# Patient Record
Sex: MALE | Race: WHITE | HISPANIC OR LATINO | Employment: FULL TIME | URBAN - METROPOLITAN AREA
[De-identification: names, ages, dates, MRNs, and addresses within clinical notes are randomized per-mention and may not be internally consistent; named-entity substitution may affect disease eponyms.]

---

## 2019-03-25 ENCOUNTER — HOSPITAL ENCOUNTER (EMERGENCY)
Facility: HOSPITAL | Age: 39
Discharge: HOME | End: 2019-03-25
Attending: EMERGENCY MEDICINE
Payer: COMMERCIAL

## 2019-03-25 VITALS
OXYGEN SATURATION: 99 % | DIASTOLIC BLOOD PRESSURE: 75 MMHG | WEIGHT: 208 LBS | HEART RATE: 70 BPM | TEMPERATURE: 98 F | RESPIRATION RATE: 15 BRPM | BODY MASS INDEX: 29.78 KG/M2 | SYSTOLIC BLOOD PRESSURE: 125 MMHG | HEIGHT: 70 IN

## 2019-03-25 DIAGNOSIS — R06.00 PAROXYSMAL NOCTURNAL DYSPNEA: ICD-10-CM

## 2019-03-25 DIAGNOSIS — R06.02 SHORTNESS OF BREATH: Primary | ICD-10-CM

## 2019-03-25 LAB
ALBUMIN SERPL BCP-MCNC: 4.5 G/DL (ref 3.5–5.2)
ALP SERPL-CCNC: 113 U/L (ref 55–135)
ALT SERPL W/O P-5'-P-CCNC: 28 U/L (ref 10–44)
ANION GAP SERPL CALC-SCNC: 10 MMOL/L (ref 8–16)
AST SERPL-CCNC: 22 U/L (ref 10–40)
BASOPHILS # BLD AUTO: 0.02 K/UL (ref 0–0.2)
BASOPHILS NFR BLD: 0.3 % (ref 0–1.9)
BILIRUB SERPL-MCNC: 0.7 MG/DL (ref 0.1–1)
BNP SERPL-MCNC: <10 PG/ML (ref 0–99)
BUN SERPL-MCNC: 14 MG/DL (ref 6–20)
CALCIUM SERPL-MCNC: 9.7 MG/DL (ref 8.7–10.5)
CHLORIDE SERPL-SCNC: 105 MMOL/L (ref 95–110)
CO2 SERPL-SCNC: 23 MMOL/L (ref 23–29)
CREAT SERPL-MCNC: 0.7 MG/DL (ref 0.5–1.4)
DIFFERENTIAL METHOD: NORMAL
EOSINOPHIL # BLD AUTO: 0.1 K/UL (ref 0–0.5)
EOSINOPHIL NFR BLD: 1.7 % (ref 0–8)
ERYTHROCYTE [DISTWIDTH] IN BLOOD BY AUTOMATED COUNT: 13.2 % (ref 11.5–14.5)
EST. GFR  (AFRICAN AMERICAN): >60 ML/MIN/1.73 M^2
EST. GFR  (NON AFRICAN AMERICAN): >60 ML/MIN/1.73 M^2
GLUCOSE SERPL-MCNC: 92 MG/DL (ref 70–110)
HCT VFR BLD AUTO: 49.7 % (ref 40–54)
HGB BLD-MCNC: 16.5 G/DL (ref 14–18)
IMM GRANULOCYTES # BLD AUTO: 0.01 K/UL (ref 0–0.04)
IMM GRANULOCYTES NFR BLD AUTO: 0.2 % (ref 0–0.5)
LYMPHOCYTES # BLD AUTO: 1.3 K/UL (ref 1–4.8)
LYMPHOCYTES NFR BLD: 22 % (ref 18–48)
MCH RBC QN AUTO: 27.5 PG (ref 27–31)
MCHC RBC AUTO-ENTMCNC: 33.2 G/DL (ref 32–36)
MCV RBC AUTO: 83 FL (ref 82–98)
MONOCYTES # BLD AUTO: 0.5 K/UL (ref 0.3–1)
MONOCYTES NFR BLD: 7.5 % (ref 4–15)
NEUTROPHILS # BLD AUTO: 4.1 K/UL (ref 1.8–7.7)
NEUTROPHILS NFR BLD: 68.3 % (ref 38–73)
NRBC BLD-RTO: 0 /100 WBC
PLATELET # BLD AUTO: 291 K/UL (ref 150–350)
PMV BLD AUTO: 10.2 FL (ref 9.2–12.9)
POTASSIUM SERPL-SCNC: 4.3 MMOL/L (ref 3.5–5.1)
PROT SERPL-MCNC: 7.7 G/DL (ref 6–8.4)
RBC # BLD AUTO: 6 M/UL (ref 4.6–6.2)
SODIUM SERPL-SCNC: 138 MMOL/L (ref 136–145)
TROPONIN I SERPL DL<=0.01 NG/ML-MCNC: <0.006 NG/ML (ref 0–0.03)
WBC # BLD AUTO: 6 K/UL (ref 3.9–12.7)

## 2019-03-25 PROCEDURE — 93010 ELECTROCARDIOGRAM REPORT: CPT | Mod: ,,, | Performed by: INTERNAL MEDICINE

## 2019-03-25 PROCEDURE — 93010 EKG 12-LEAD: ICD-10-PCS | Mod: ,,, | Performed by: INTERNAL MEDICINE

## 2019-03-25 PROCEDURE — 84484 ASSAY OF TROPONIN QUANT: CPT

## 2019-03-25 PROCEDURE — 80053 COMPREHEN METABOLIC PANEL: CPT

## 2019-03-25 PROCEDURE — 83880 ASSAY OF NATRIURETIC PEPTIDE: CPT

## 2019-03-25 PROCEDURE — 99285 EMERGENCY DEPT VISIT HI MDM: CPT | Mod: 25

## 2019-03-25 PROCEDURE — 99284 EMERGENCY DEPT VISIT MOD MDM: CPT | Mod: ,,, | Performed by: EMERGENCY MEDICINE

## 2019-03-25 PROCEDURE — 99284 PR EMERGENCY DEPT VISIT,LEVEL IV: ICD-10-PCS | Mod: ,,, | Performed by: EMERGENCY MEDICINE

## 2019-03-25 PROCEDURE — 93005 ELECTROCARDIOGRAM TRACING: CPT

## 2019-03-25 PROCEDURE — 85025 COMPLETE CBC W/AUTO DIFF WBC: CPT

## 2019-03-25 NOTE — ED PROVIDER NOTES
"Encounter Date: 3/25/2019       History     Chief Complaint   Patient presents with    Shortness of Breath     Pt c/o SOB x several days.  Worsens with lying down.   States he was seen in [facility] on Friday for same c/o-told he had sleep apnea and needed sleep study.      38-year-old with no pertinent PMHx who presents to the ED with c/o shortness of breath. He reports developing shortness of breath while lying down to sleep about one week ago. He describes his SOB as feeling a though he is breathing "too slowly" to oxygenate his lungs. He has been waking more frequently throughout the night with feeling SOB, which has led to daytime sleepiness. Denies having symptoms during the day or with exertion. He was evaluated at an Urgent Care facility three times for his symptoms and was told that he has apnea. He was prescribed Ventolin inhaler without relief. He was referred to a Sleep Study clinic, but would like to be evaluated at an earlier date as his symptoms have kept him from sleeping. Denies cough, chest pain, congestion, sore throat, acid reflux, n/v, or any other medical complaints.     The history is provided by the patient.     Review of patient's allergies indicates:  Allergies not on file  No past medical history on file.  No past surgical history on file.  No family history on file.  Social History     Tobacco Use    Smoking status: Not on file   Substance Use Topics    Alcohol use: Not on file    Drug use: Not on file     Review of Systems   Constitutional: Positive for fatigue. Negative for chills and fever.   HENT: Negative for congestion and sore throat.    Respiratory: Positive for shortness of breath. Negative for cough.         +paroxysmal nocturnal dyspnea    Cardiovascular: Negative for chest pain.   Gastrointestinal: Negative for abdominal pain, constipation, diarrhea, nausea and vomiting.   Genitourinary: Negative for dysuria and frequency.   Musculoskeletal: Negative for " back pain and neck pain.   Skin: Negative for rash.   Neurological: Negative for dizziness, weakness, light-headedness and headaches.   Hematological: Does not bruise/bleed easily.   Psychiatric/Behavioral: Negative for confusion.       Physical Exam     Initial Vitals [03/25/19 1216]   BP Pulse Resp Temp SpO2   126/71 74 16 97.7 °F (36.5 °C) 98 %      MAP       --         Physical Exam    Nursing note and vitals reviewed.  Constitutional: He appears well-developed and well-nourished.   HENT:   Head: Normocephalic and atraumatic.   Eyes: Conjunctivae and EOM are normal.   Neck: Normal range of motion. Neck supple.   Cardiovascular: Normal rate, regular rhythm and normal heart sounds.   Pulmonary/Chest: Effort normal and breath sounds normal. He has no wheezes. He has no rhonchi. He has no rales.   Abdominal: Soft. There is no tenderness. There is no rebound and no guarding.   Musculoskeletal: Normal range of motion. He exhibits no edema or tenderness.   Neurological: He is alert and oriented to person, place, and time. He has normal strength.   Skin: Skin is warm. Capillary refill takes less than 2 seconds.         ED Course   Procedures  Labs Reviewed   B-TYPE NATRIURETIC PEPTIDE    Narrative:     ONE LAVENDER SHARED   COMPREHENSIVE METABOLIC PANEL    Narrative:     ONE LAVENDER SHARED   CBC W/ AUTO DIFFERENTIAL    Narrative:     ONE LAVENDER SHARED   TROPONIN I    Narrative:     ONE LAVENDER SHARED     EKG Readings: (Independently Interpreted)   Sinus rhythm at a rate 74 bpm normal atrial and ventricular axis; intervals within normal limits; no stemi     ECG Results          EKG 12-lead (Final result)  Result time 03/25/19 14:50:37    Final result by Interface, Lab In [facility] (03/25/19 14:50:37)                 Narrative:    Test Reason : i49.9    Vent. Rate : 074 BPM     Atrial Rate : 074 BPM     P-R Int : 146 ms          QRS Dur : 100 ms      QT Int : 356 ms       P-R-T Axes : 035 076 034 degrees     QTc Int :  395 ms    Normal sinus rhythm with sinus arrhythmia  Normal ECG  No previous ECGs available  Confirmed by BARBARA GRECO MD ([id number]) on 3/25/2019 2:50:26 PM    Referred By: ROXANNAERR   SELF           Confirmed By:BARBARA GRECO MD                            Imaging Results          X-Ray Chest PA And Lateral (Final result)  Result time 03/25/19 14:18:03    Final result by Nikolay Jacques MD (03/25/19 14:18:03)                 Impression:      1. No acute cardiopulmonary process.      Electronically signed by: Nikolay Jacques MD  Date:    03/25/2019  Time:    14:18             Narrative:    EXAMINATION:  XR CHEST PA AND LATERAL    CLINICAL HISTORY:  Dyspnea, unspecified    TECHNIQUE:  PA and lateral views of the chest were performed.    COMPARISON:  None    FINDINGS:  The cardiomediastinal silhouette is not enlarged.  There is no pleural effusion.  The trachea is midline.  The lungs are symmetrically expanded bilaterally without evidence of acute parenchymal process.  There may be a calcified granuloma projected over the right midlung zone.  No large focal consolidation seen.  There is no pneumothorax.  The osseous structures are remarkable for degenerative changes of the spine..                                 Medical Decision Making:   History:   Old Medical Records: I decided to obtain old medical records.  Initial Assessment:   38-year-old with no pertinent PMHx who presents to the ED with c/o shortness of breath. He reports having SOB at night with lying down and paroxysmal nocturnal dyspnea x 1 week. He was evaluated at an Urgent Care facility and was prescribed Ventolin for apnea and was referred to Sleep Study clinic. Vital signs are stable. RRR. Lungs CTA bilaterally. Abdomen soft and non tender. No LE edema or tenderness.   Differential Diagnosis:   DDx includes but is not limited to sleep apnea, CHF, viral illness, pneumonia. Considered but do not suspect atypical presentation of ACS in patient with SOB x 1  week who does not have any risk factors.   Independently Interpreted Test(s):   I have ordered and independently interpreted EKG Reading(s) - see prior notes  Clinical Tests:   Lab Tests: Reviewed and Ordered  Radiological Study: Ordered and Reviewed  Medical Tests: Ordered and Reviewed  ED Management:  Will obtain basic labs, troponin, BNP, and CXR.    CXR without acute cardiopulmonary process. BNP and troponin negative. Chemistries unremarkable. No leukocytosis. Hemoglobin 16.5. Upon reassessment, patient is resting comfortably, in no acute distress. Reviewed results with patient. He is stable for discharge with instructions to follow up with a primary care physician. Pt given information on primary care physicians within his area. Pt should inquire about his previous referral to a Sleep Clinic as he needs to have sleep study done. Pt instructed to continue Ventolin use. Pt expressed understanding and is agreeable with the plan.    I have discussed the treatment and management of this patient with my supervising physician, and we agree on the plan of care.      Agnse Blankenship PA-C                        Clinical Impression:       ICD-10-CM ICD-9-CM   1. Shortness of breath R06.02 786.05   2. Paroxysmal nocturnal dyspnea R06.00 786.09         Disposition:   Disposition: Discharged  Condition: Stable                        Agnes Blankenship PA-C  03/25/19 [id number]

## 2019-03-25 NOTE — DISCHARGE INSTRUCTIONS
You should follow up with a primary care physician to be referred to a Sleep Clinic. You should return to the ER if your symptoms worsen or if you develop any other concerning symptoms.

## 2019-03-25 NOTE — ED NOTES
Chief Complaint   Patient presents with    Shortness of Breath     Pt c/o SOB x several days.  Worsens with lying down.   States he was seen in [facility] on Friday for same c/o-told he had sleep apnea and needed sleep study.      Pt presents to ED with c/o difficulty sleeping for multiple weeks. Pt states he was seen at the clinic and DX with sleep apnea. Pt states he bought a used c-pap machine without relief. Pt denies SOB at present.    Adult physical assessment:    Neuro: Patient AAOx4.   HEENT: PERRLA, no purulent drainage to nares, throat, eyes.  Cardiac: No abnormalities noted. No C/O CP at this time.  Respiratory: Airway is open and patent. No C/O SOB at this time.  Musculoskeletal: Patient moving all extremities spontaneously. No obvious swelling or deformity visible.    Active Ambulatory Problems     Diagnosis Date Noted    No Active Ambulatory Problems     Resolved Ambulatory Problems     Diagnosis Date Noted    No Resolved Ambulatory Problems     No Additional Past Medical History     Review of patient's allergies indicates:  Allergies not on file

## 2019-03-25 NOTE — PROVIDER PROGRESS NOTES - EMERGENCY DEPT.
Encounter Date: 3/25/2019    ED Physician Progress Notes       SCRIBE NOTE: I, Brian Dickens, am scribing for, and in the presence of,  Dr. Salter.  Physician Statement: I, Dr. Salter, personally performed the services described in this documentation as scribed by Brian Dickens in my presence, and it is both accurate and complete.      EKG - STEMI Decision  Initial Reading: No STEMI present.